# Patient Record
Sex: MALE | Race: WHITE | Employment: OTHER | URBAN - NONMETROPOLITAN AREA
[De-identification: names, ages, dates, MRNs, and addresses within clinical notes are randomized per-mention and may not be internally consistent; named-entity substitution may affect disease eponyms.]

---

## 2020-11-04 LAB
CHOLESTEROL, TOTAL: 213 MG/DL
CHOLESTEROL/HDL RATIO: 3
HDLC SERPL-MCNC: 61 MG/DL (ref 35–70)
LDL CHOLESTEROL CALCULATED: 133 MG/DL (ref 0–160)
NONHDLC SERPL-MCNC: ABNORMAL MG/DL
TRIGL SERPL-MCNC: 96 MG/DL
VLDLC SERPL CALC-MCNC: ABNORMAL MG/DL

## 2020-12-15 ENCOUNTER — OFFICE VISIT (OUTPATIENT)
Dept: FAMILY MEDICINE CLINIC | Age: 60
End: 2020-12-15
Payer: COMMERCIAL

## 2020-12-15 VITALS
DIASTOLIC BLOOD PRESSURE: 90 MMHG | SYSTOLIC BLOOD PRESSURE: 165 MMHG | WEIGHT: 199.6 LBS | BODY MASS INDEX: 28.58 KG/M2 | OXYGEN SATURATION: 99 % | HEART RATE: 71 BPM | HEIGHT: 70 IN | TEMPERATURE: 96.9 F

## 2020-12-15 PROBLEM — Z83.71 FAMILY HISTORY OF COLONIC POLYPS: Status: ACTIVE | Noted: 2020-12-15

## 2020-12-15 PROBLEM — J30.2 SEASONAL ALLERGIES: Status: ACTIVE | Noted: 2020-12-15

## 2020-12-15 PROBLEM — Z91.89 AT RISK FOR SLEEP APNEA: Status: ACTIVE | Noted: 2020-12-15

## 2020-12-15 PROBLEM — M54.32 LEFT SIDED SCIATICA: Status: ACTIVE | Noted: 2020-12-15

## 2020-12-15 PROBLEM — R03.0 ELEVATED BP WITHOUT DIAGNOSIS OF HYPERTENSION: Status: ACTIVE | Noted: 2020-12-15

## 2020-12-15 PROCEDURE — 90715 TDAP VACCINE 7 YRS/> IM: CPT | Performed by: NURSE PRACTITIONER

## 2020-12-15 PROCEDURE — 99386 PREV VISIT NEW AGE 40-64: CPT | Performed by: NURSE PRACTITIONER

## 2020-12-15 PROCEDURE — 90471 IMMUNIZATION ADMIN: CPT | Performed by: NURSE PRACTITIONER

## 2020-12-15 ASSESSMENT — ENCOUNTER SYMPTOMS
COUGH: 0
EYE DISCHARGE: 0
DIARRHEA: 0
BACK PAIN: 1
VOMITING: 0
SHORTNESS OF BREATH: 0
RHINORRHEA: 0
CONSTIPATION: 0
CHEST TIGHTNESS: 0
ABDOMINAL PAIN: 0

## 2020-12-15 ASSESSMENT — PATIENT HEALTH QUESTIONNAIRE - PHQ9
SUM OF ALL RESPONSES TO PHQ QUESTIONS 1-9: 0
SUM OF ALL RESPONSES TO PHQ9 QUESTIONS 1 & 2: 0
SUM OF ALL RESPONSES TO PHQ QUESTIONS 1-9: 0
SUM OF ALL RESPONSES TO PHQ QUESTIONS 1-9: 0
1. LITTLE INTEREST OR PLEASURE IN DOING THINGS: 0
2. FEELING DOWN, DEPRESSED OR HOPELESS: 0

## 2020-12-15 NOTE — PATIENT INSTRUCTIONS
during a routine doctor visit. Your doctor will tell you how often to check your blood pressure based on your age, your blood pressure results, and other factors. · Prostate exam. Talk to your doctor about whether you should have a blood test (called a PSA test) for prostate cancer. Experts recommend that you discuss the benefits and risks of the test with your doctor before you decide whether to have this test.  · Diabetes. Ask your doctor whether you should have tests for diabetes. · Vision. Some experts recommend that you have yearly exams for glaucoma and other age-related eye problems starting at age 48. · Hearing. Tell your doctor if you notice any change in your hearing. You can have tests to find out how well you hear. · Colorectal cancer. Your risk for colorectal cancer gets higher as you get older. Some experts say that adults should start regular screening at age 48 and stop at age 76. Others say to start before age 48 or continue after age 76. Talk with your doctor about your risk and when to start and stop screening. · Heart attack and stroke risk. At least every 4 to 6 years, you should have your risk for heart attack and stroke assessed. Your doctor uses factors such as your age, blood pressure, cholesterol, and whether you smoke or have diabetes to show what your risk for a heart attack or stroke is over the next 10 years. · Abdominal aortic aneurysm. Ask your doctor whether you should have a test to check for an aneurysm. You may need a test if you ever smoked or if your parent, brother, sister, or child has had an aneurysm. When should you call for help? Watch closely for changes in your health, and be sure to contact your doctor if you have any problems or symptoms that concern you. Where can you learn more? Go to https://shen.[employer]. org and sign in to your [employer] account.  Enter A070 in the [facility] box to learn more about \"Well Visit, Men 48 to 72: Care Instructions. \"     If you do not have an account, please click on the \"Sign Up Now\" link. Current as of: May 27, 2020               Content Version: 12.6  © 2006-2020 [employer]. Care instructions adapted under license by [facility] ([facility]). If you have questions about a medical condition or this instruction, always ask your healthcare professional. [address] any warranty or liability for your use of this information.

## 2020-12-15 NOTE — PROGRESS NOTES
[address] [facility], [facility]. [facility] [address]  Dept: [phone]  Dept Fax: : [phone]  Critical access hospital Fax: [phone]     Brandon Lara is a 61 y.o. male who presents today for his medical conditions/complaintsas noted below. Chief Complaint   Patient presents with   [address]     had flu shot at work in October        HPI:      Here to establish care    Family -wife comes to  me  Occupation- [employer], [employer]  [employer]     Has 8 grandchildren  Had 9 kids in the family     Dental - not routinely     Eye - glasses, has had an apt 2-3 years ago     Diet - does not salt extra food, pizza, some fast food, does eat fish, likes fruit and vegetables   Exercise- states that he takes quite a few steps during the day     Last labs- had completed at work in October    Colonoscopy - no  Diarrhea - no  Constipation - no  Has BM once a day, soft  Hx of polyps in the family, not colon cancer    Elevated blood pressure  Onset 2020  Does not monitor BP at home  Unsure of family history   Does have \"achey\" lower left leg that is relieved with rest    Hx of left sided sciatica. Intermittent for years  Does try to stretch and this helps  Denies cauda equina symptoms    Seasonal allergies  Itchy eyes, nasal congestion   Onset years ago  Does take antihistamine    Snoring. Do you snore loudly (loud enough to be heard through closed doors, or your bed partner elbows you for snoring at night? yes    Tired. Do you often feel tired, fatigued, or sleepy during the daytime (such as falling alseep during driving)? rare    Observed. Has anyone observed you stop breathing, or choking/gasping during your sleep? no  Pressure. Do you have or are being treated for high blood pressure? No not, yet has not been to provider in years, elevated today- yes     Body mass index greater than 35 kg/m2?  no    Age older than 48? yes    Neck size large? (measure around srivastava enio)   Male, 17 in or larger- no   Female 16 in or larger  Gender = Male? yes    Scoring criteria    General population      Intermediate risk of ZONIA: Yes to 3-4 questions      References   1. Brandon Mtz et al. Keller Fabry questionaire: a tool to screen patients for obstructive sleep apnea   2. Annel whitley. High STOP-Bang score indicates a high probability of obstructive sleep apnea        Medications:  No current outpatient medications on file. The patienthas No Known Allergies. Past Medical History  Osvaldo Hwang  has no past medical history on file. Past Surgical History  The patient  has no past surgical history on file. Family History  This patient's family history includes Colon Polyps in an other family member. Social History  Osvaldo Hwang  reports that he quit smoking about 4 years ago. His smoking use included cigarettes. He has never used smokeless tobacco.    Health Maintenance  Health Maintenance Due   Topic Date Due    Lipid screen  08/01/2000    Shingles Vaccine (1 of 2) 08/01/2010    Colon cancer screen colonoscopy  08/01/2010       Subjective:     Review of Systems   Constitutional: Negative for activity change, appetite change and fever. HENT: Positive for congestion. Negative for ear pain and rhinorrhea. Eyes: Negative for discharge and visual disturbance. Respiratory: Negative for cough, chest tightness and shortness of breath. Cardiovascular: Negative for chest pain and palpitations. Gastrointestinal: Negative for abdominal pain, constipation, diarrhea and vomiting. Genitourinary: Negative for difficulty urinating and hematuria. Musculoskeletal: Positive for back pain (intermittent ). Negative for arthralgias and myalgias (occasionally left lower leg). Skin: Negative for rash. Allergic/Immunologic: Positive for environmental allergies.    Neurological: Negative for dizziness, weakness, numbness (rare left upper thigh, leg ) and headaches. Psychiatric/Behavioral: The patient is not nervous/anxious. Objective:     BP (!) 165/90   Pulse 71   Temp 96.9 °F (36.1 °C) (Temporal)   Ht 5' 10\" (1.778 m)   Wt 199 lb 9.6 oz (90.5 kg)   SpO2 99%   BMI 28.64 kg/m²      Physical Exam  Vitals signs reviewed. Constitutional:       Appearance: He is well-developed. HENT:      Head: Normocephalic and atraumatic. Right Ear: Tympanic membrane, ear canal and external ear normal.      Left Ear: Tympanic membrane, ear canal and external ear normal.      Nose: Congestion present. Mouth/Throat:      Lips: Pink. Pharynx: Oropharynx is clear. Uvula midline. Tonsils: No tonsillar exudate or tonsillar abscesses. Eyes:      Conjunctiva/sclera: Conjunctivae normal.      Comments: Has glasses   Neck:      Musculoskeletal: Normal range of motion and neck supple. No spinous process tenderness. Thyroid: No thyromegaly. Trachea: Trachea normal.   Cardiovascular:      Rate and Rhythm: Normal rate and regular rhythm. Heart sounds: Normal heart sounds. No murmur. No friction rub. No gallop. Pulmonary:      Effort: Pulmonary effort is normal.      Breath sounds: Normal breath sounds. Abdominal:      General: Bowel sounds are normal.      Palpations: Abdomen is soft. Tenderness: There is no abdominal tenderness. Musculoskeletal: Normal range of motion. Right lower leg: No edema. Left lower leg: No edema. Skin:     General: Skin is warm and dry. Findings: No erythema or rash. Neurological:      Mental Status: He is alert and oriented to person, place, and time. Psychiatric:         Speech: Speech normal.         Behavior: Behavior normal.         Thought Content: Thought content normal.         Assessment/Plan:      Rose Marie De Souza was seen today for establish care.     Diagnoses and all orders for this visit:    Annual physical exam   Has labs completed- will obtain and review with him at his follow up apt in a cooule of weeks  Encounter for biometric screening    Screening for diabetes mellitus    Screening for thyroid disorder    Screening, lipid    Screening for colon cancer  -     MANOLO - Laurie Del Real MD, Gastroenterology, [facility]  With family history of colon polyps will refer for colonoscopy instead of cologuard    Need for hepatitis C screening test    Encounter for screening for HIV    Elevated BP without diagnosis of hypertension  Does not have much room to change lifestyle- is active and has overall healthy diet  Will re eval in 2 weeks- if still elevated will start medication   Family history of colonic polyps  -     MANOLO - Laurie Del Real MD, Gastroenterology, [place] [place]    Need for diphtheria-tetanus-pertussis (Tdap) vaccine  -     Tdap (age 6y and older) IM ([address])    At risk for sleep apnea  Intermediate risk- if BP is elevated at next visit consider pulmonary consult for sleep study  Seasonal allergies  Controlled with OTC medication  Left sided sciatica  Continue stretching       Return in about 2 weeks (around 12/29/2020) for fu BP, labs . Aubrey received counselingon the following healthy behaviors: nutrition and exercise    Patientgiven educational materials on wellness    Discussed use, benefit, andside effects of prescribed medications. Barriers to medication compliance addressed. All patient questions answered. Pt voiced understanding.      Electronically signedby JEREMY Herbert CNP on 12/15/2020 at 9:10 AM

## 2020-12-15 NOTE — PROGRESS NOTES
After obtaining consent, and per orders of Juan Lorenzo CNP, injection of 0.5mL Boostrix given in Left deltoid by Hillary Sethi. Patient instructed to remain in clinic for 20 minutes afterwards, and to report any adverse reaction to me immediately.   Immunizations Administered     Name Date Dose Route    Tdap (Boostrix, Adacel) 12/15/2020 0.5 mL Intramuscular    Site: Deltoid- Left    Lot: [id number]    NDC: 58867-007-57

## 2020-12-29 ENCOUNTER — OFFICE VISIT (OUTPATIENT)
Dept: FAMILY MEDICINE CLINIC | Age: 60
End: 2020-12-29
Payer: COMMERCIAL

## 2020-12-29 VITALS
OXYGEN SATURATION: 99 % | BODY MASS INDEX: 29.13 KG/M2 | TEMPERATURE: 97 F | DIASTOLIC BLOOD PRESSURE: 82 MMHG | SYSTOLIC BLOOD PRESSURE: 137 MMHG | HEART RATE: 72 BPM | WEIGHT: 203 LBS

## 2020-12-29 LAB
BILIRUBIN, POC: NORMAL
BLOOD URINE, POC: NORMAL
CLARITY, POC: CLEAR
COLOR, POC: YELLOW
GLUCOSE URINE, POC: NORMAL
KETONES, POC: NORMAL
LEUKOCYTE EST, POC: NORMAL
NITRITE, POC: NORMAL
PH, POC: 5.5
PROTEIN, POC: NORMAL
SPECIFIC GRAVITY, POC: 1.01
UROBILINOGEN, POC: 0.2

## 2020-12-29 PROCEDURE — 99214 OFFICE O/P EST MOD 30 MIN: CPT | Performed by: NURSE PRACTITIONER

## 2020-12-29 PROCEDURE — 93000 ELECTROCARDIOGRAM COMPLETE: CPT | Performed by: NURSE PRACTITIONER

## 2020-12-29 PROCEDURE — 81003 URINALYSIS AUTO W/O SCOPE: CPT | Performed by: NURSE PRACTITIONER

## 2020-12-29 RX ORDER — ZOSTER VACCINE RECOMBINANT, ADJUVANTED 50 MCG/0.5
0.5 KIT INTRAMUSCULAR SEE ADMIN INSTRUCTIONS
Qty: 0.5 ML | Refills: 0 | Status: SHIPPED | OUTPATIENT
Start: 2020-12-29 | End: 2020-12-30

## 2020-12-29 RX ORDER — LISINOPRIL 10 MG/1
10 TABLET ORAL DAILY
Qty: 30 TABLET | Refills: 0 | Status: CANCELLED | OUTPATIENT
Start: 2020-12-29

## 2020-12-29 SDOH — ECONOMIC STABILITY: TRANSPORTATION INSECURITY
IN THE PAST 12 MONTHS, HAS THE LACK OF TRANSPORTATION KEPT YOU FROM MEDICAL APPOINTMENTS OR FROM GETTING MEDICATIONS?: NO

## 2020-12-29 SDOH — ECONOMIC STABILITY: FOOD INSECURITY: WITHIN THE PAST 12 MONTHS, THE FOOD YOU BOUGHT JUST DIDN'T LAST AND YOU DIDN'T HAVE MONEY TO GET MORE.: NEVER TRUE

## 2020-12-29 SDOH — ECONOMIC STABILITY: TRANSPORTATION INSECURITY
IN THE PAST 12 MONTHS, HAS LACK OF TRANSPORTATION KEPT YOU FROM MEETINGS, WORK, OR FROM GETTING THINGS NEEDED FOR DAILY LIVING?: NO

## 2020-12-29 SDOH — ECONOMIC STABILITY: FOOD INSECURITY: WITHIN THE PAST 12 MONTHS, YOU WORRIED THAT YOUR FOOD WOULD RUN OUT BEFORE YOU GOT MONEY TO BUY MORE.: NEVER TRUE

## 2020-12-29 SDOH — ECONOMIC STABILITY: INCOME INSECURITY: HOW HARD IS IT FOR YOU TO PAY FOR THE VERY BASICS LIKE FOOD, HOUSING, MEDICAL CARE, AND HEATING?: NOT HARD AT ALL

## 2020-12-29 ASSESSMENT — ENCOUNTER SYMPTOMS
ABDOMINAL PAIN: 0
SHORTNESS OF BREATH: 0
RHINORRHEA: 0
CHEST TIGHTNESS: 0
BACK PAIN: 1
COUGH: 0
DIARRHEA: 0
EYE DISCHARGE: 0
CONSTIPATION: 0
VOMITING: 0

## 2020-12-29 NOTE — PROGRESS NOTES
[address] [facility] FAMILY MEDICINE, [facility]. [facility] [address]  Dept: [phone]  Dept Fax: : [phone]  Loc Fax: [phone]     Brandon Easton is a 61 y.o. male who presents today for his medical conditions/complaintsas noted below. Chief Complaint   Patient presents with    Follow-up     blood pressure     Health Maintenance     colonoscopy       HPI:      Wants to review labs from [facility]    Is schedule with Dr. Genie Robledo for colonoscopy next month    Essential Hypertension  Elevated blood pressure  Onset 2020  Does not monitor BP at home  Unsure of family history   Does have \"achey\" lower left leg that is relieved with rest  Denies CP or palpitations. Medications:    Current Outpatient Medications:     zoster recombinant adjuvanted vaccine (SHINGRIX) 50 MCG/0.5ML SUSR injection, Inject 0.5 mLs into the muscle See Admin Instructions for 1 day, Disp: 0.5 mL, Rfl: 0    The patienthas No Known Allergies. Past Medical History  Skye Menendez  has no past medical history on file. Past Surgical History  The patient  has no past surgical history on file. Family History  This patient's family history includes Colon Polyps in an other family member. Social History  Skye Menendez  reports that he quit smoking about 4 years ago. His smoking use included cigarettes. He has never used smokeless tobacco.    Health Maintenance  Health Maintenance Due   Topic Date Due    Lipid screen  08/01/2000    Shingles Vaccine (1 of 2) 08/01/2010    Colon cancer screen colonoscopy  08/01/2010       Subjective:     Review of Systems   Constitutional: Negative for activity change, appetite change and fever. HENT: Positive for congestion. Negative for ear pain and rhinorrhea. Eyes: Negative for discharge and visual disturbance. Respiratory: Negative for cough, chest tightness and shortness of breath. Cardiovascular: Negative for chest pain and palpitations. Gastrointestinal: Negative for abdominal pain, constipation, diarrhea and vomiting. Genitourinary: Negative for difficulty urinating and hematuria. Musculoskeletal: Positive for back pain (intermittent ). Negative for arthralgias and myalgias (occasionally left lower leg). Skin: Negative for rash. Allergic/Immunologic: Positive for environmental allergies. Neurological: Negative for dizziness, weakness, numbness (rare left upper thigh, leg ) and headaches. Psychiatric/Behavioral: The patient is not nervous/anxious. Objective:     /82   Pulse 72   Temp 97 °F (36.1 °C) (Temporal)   Wt 203 lb (92.1 kg)   SpO2 99%   BMI 29.13 kg/m²      Physical Exam  Vitals signs reviewed. Constitutional:       Appearance: He is well-developed. HENT:      Head: Normocephalic and atraumatic. Right Ear: External ear normal.      Left Ear: External ear normal.   Eyes:      Conjunctiva/sclera: Conjunctivae normal.   Neck:      Musculoskeletal: Normal range of motion and neck supple. No spinous process tenderness. Thyroid: No thyromegaly. Trachea: Trachea normal.   Cardiovascular:      Rate and Rhythm: Normal rate and regular rhythm. Heart sounds: Normal heart sounds. No murmur. No friction rub. No gallop. Pulmonary:      Effort: Pulmonary effort is normal.      Breath sounds: Normal breath sounds. Abdominal:      General: Bowel sounds are normal.      Palpations: Abdomen is soft. Tenderness: There is no abdominal tenderness. Musculoskeletal: Normal range of motion. Skin:     General: Skin is warm and dry. Findings: No erythema or rash. Neurological:      Mental Status: He is alert and oriented to person, place, and time. Psychiatric:         Speech: Speech normal.         Behavior: Behavior normal.         Thought Content: Thought content normal.         Assessment/Plan:      Onofre Souza was seen today for follow-up and health maintenance.     Diagnoses and all orders for this visit:    Essential hypertension   New diagnosis   Stage 1   Lifestyle changes, DASH diet, increase exercise   Can monitor at home  -     EKG 12 Lead- normal  -     POCT Urinalysis No Micro (Auto)- reviewed normal    Need for shingles vaccine  -     zoster recombinant adjuvanted vaccine (SHINGRIX) 50 MCG/0.5ML SUSR injection; Inject 0.5 mLs into the muscle See Admin Instructions for 1 day    keep apt for colonoscopy  Discussed benefit of shingles vaccine  Reviewed [facility] screening lab       Return in about 6 months (around 6/29/2021) for FU BP. Aubrey received counselingon the following healthy behaviors: nutrition and exercise    Patientgiven educational materials on Essential HTN    I have instructedAubrey to complete a self tracking handout on Blood Pressures  and instructedthem to bring it with them to his next appointment. Discussed use, benefit, andside effects of prescribed medications. Barriers to medication compliance addressed. All patient questions answered. Pt voiced understanding.      Electronically signedby JEREMY Whitney CNP on 12/29/2020 at 8:39 AM

## 2020-12-29 NOTE — PATIENT INSTRUCTIONS
Patient Education        Learning About High Blood Pressure  What is high blood pressure? Blood pressure is a measure of how hard the blood pushes against the walls of your arteries. It's normal for blood pressure to go up and down throughout the day. But if it stays up, you have high blood pressure. Another name for high blood pressure is hypertension. Two numbers tell you your blood pressure. The first number is the systolic pressure (top number). It shows how hard the blood pushes when your heart is pumping. The second number is the diastolic pressure (bottom number). It shows how hard the blood pushes between heartbeats, when your heart is relaxed and filling with blood. Your doctor will give you a goal for your blood pressure based on your health and your age. High blood pressure (hypertension) means that the top number stays high, or the bottom number stays high, or both. High blood pressure increases the risk of stroke, heart attack, and other problems. What happens when you have high blood pressure? · Blood flows through your arteries with too much force. Over time, this can damage the heart and the walls of your arteries. But you can't feel it. High blood pressure usually doesn't cause symptoms. · High blood pressure makes your heart work harder. And that can lead to heart failure, which means your heart doesn't pump as much blood as your body needs. · Fat and calcium start to build up in your arteries. This buildup is called hardening of the arteries. It can cause many problems including a heart attack and stroke. · Arteries also carry blood and oxygen to organs like your eyes, kidneys, and brain. If high blood pressure damages those arteries, it can lead to vision loss, kidney disease, stroke, and a higher risk of dementia. How can you prevent high blood pressure? · Stay at a healthy weight. · Try to limit how much sodium you eat to less than 2,300 milligrams (mg) a day.  If you limit your

## 2021-07-06 ENCOUNTER — OFFICE VISIT (OUTPATIENT)
Dept: FAMILY MEDICINE CLINIC | Age: 61
End: 2021-07-06
Payer: COMMERCIAL

## 2021-07-06 VITALS
DIASTOLIC BLOOD PRESSURE: 80 MMHG | SYSTOLIC BLOOD PRESSURE: 131 MMHG | TEMPERATURE: 97.7 F | HEART RATE: 81 BPM | BODY MASS INDEX: 28.55 KG/M2 | RESPIRATION RATE: 18 BRPM | WEIGHT: 199.4 LBS | OXYGEN SATURATION: 98 % | HEIGHT: 70 IN

## 2021-07-06 DIAGNOSIS — J30.2 SEASONAL ALLERGIES: ICD-10-CM

## 2021-07-06 DIAGNOSIS — I10 ESSENTIAL HYPERTENSION: Primary | ICD-10-CM

## 2021-07-06 DIAGNOSIS — Z91.89 AT RISK FOR SLEEP APNEA: ICD-10-CM

## 2021-07-06 DIAGNOSIS — Z13.220 SCREENING, LIPID: ICD-10-CM

## 2021-07-06 PROCEDURE — 99214 OFFICE O/P EST MOD 30 MIN: CPT | Performed by: NURSE PRACTITIONER

## 2021-07-06 ASSESSMENT — ENCOUNTER SYMPTOMS
COUGH: 0
DIARRHEA: 0
EYE DISCHARGE: 0
SHORTNESS OF BREATH: 0
CONSTIPATION: 0
CHEST TIGHTNESS: 0
VOMITING: 0
ABDOMINAL PAIN: 0
RHINORRHEA: 0

## 2021-07-06 ASSESSMENT — PATIENT HEALTH QUESTIONNAIRE - PHQ9
SUM OF ALL RESPONSES TO PHQ QUESTIONS 1-9: 0
SUM OF ALL RESPONSES TO PHQ9 QUESTIONS 1 & 2: 0
1. LITTLE INTEREST OR PLEASURE IN DOING THINGS: 0
SUM OF ALL RESPONSES TO PHQ QUESTIONS 1-9: 0
2. FEELING DOWN, DEPRESSED OR HOPELESS: 0
SUM OF ALL RESPONSES TO PHQ QUESTIONS 1-9: 0

## 2021-07-06 NOTE — PROGRESS NOTES
Shelley Ring [address] [facility], [facility]. [facility] [address]  Dept: [phone]  Dept Fax: [phone]    Visit type: Established patient    Reason for Visit: Hypertension (6 month follow up)         Assessment and Plan       1. Essential hypertension  2. Screening, lipid  3. At risk for sleep apnea  -     Svitlana Alcocer MD, Pulmonology, [address]  4. Seasonal allergies    Is going to get labs done through work  Continue with DASH diet and increase exercise    Return in about 6 months (around 1/6/2022) for Anual exam.       Subjective       Gets labs [facility]    Essential Hypertension  Elevated blood pressure  Onset 2020  Does not monitor BP at home  Unsure of family history   Denies CP or palpitations. Seasonal allergies  Triggers year round   Takes - once in a while takes antihistamine     Snoring. Do you snore loudly (loud enough to be heard through closed doors, or your bed partner elbows you for snoring at night? yes    Tired. Do you often feel tired, fatigued, or sleepy during the daytime (such as falling alseep during driving)? yes    Observed. Has anyone observed you stop breathing, or choking/gasping during your sleep? yes    Pressure. Do you have or are being treated for high blood pressure? Yes     Body mass index greater than 35 kg/m2? no    Age older than 48? yes    Neck size large? (measure around srivastava apple)   Male, 17 in or larger- no   Female 16 in or larger  Gender = Male? Yes     Scoring criteria    General population      High risk of ZONIA: Yes to 5-8 questions    References   1. Heraclio Alex, et al. Abner Pereyra questionaire: a tool to screen patients for obstructive sleep apnea   2. Moriah LINDAet al. High STOP-Bang score indicates a high probability of obstructive sleep apnea         Review of Systems   Constitutional: Positive for fatigue. Negative for activity change, appetite change and fever.    HENT: Negative for congestion, ear pain and rhinorrhea. Eyes: Negative for discharge and visual disturbance. Respiratory: Negative for cough, chest tightness and shortness of breath. Cardiovascular: Negative for chest pain and palpitations. Gastrointestinal: Negative for abdominal pain, constipation, diarrhea and vomiting. Genitourinary: Negative for difficulty urinating and hematuria. Musculoskeletal: Negative for arthralgias and myalgias. Skin: Negative for rash. Allergic/Immunologic: Positive for environmental allergies. Neurological: Negative for dizziness, weakness, numbness and headaches. Psychiatric/Behavioral: The patient is not nervous/anxious. No Known Allergies    No outpatient medications prior to visit. No facility-administered medications prior to visit. History reviewed. No pertinent past medical history. Social History     Tobacco Use    Smoking status: Former Smoker     Types: Cigarettes     Quit date: 2016     Years since quittin.4    Smokeless tobacco: Never Used   Substance Use Topics    Alcohol use: Not on file        History reviewed. No pertinent surgical history. Family History   Problem Relation Age of Onset    Colon Polyps Other        Objective       /80 (Site: Left Upper Arm, Position: Sitting, Cuff Size: Large Adult)   Pulse 81   Temp 97.7 °F (36.5 °C) (Temporal)   Resp 18   Ht 5' 9.5\" (1.765 m)   Wt 199 lb 6.4 oz (90.4 kg)   SpO2 98%   BMI 29.02 kg/m²   Physical Exam  Vitals reviewed. Constitutional:       Appearance: He is well-developed. HENT:      Head: Normocephalic and atraumatic. Right Ear: External ear normal.      Left Ear: External ear normal.   Eyes:      Conjunctiva/sclera: Conjunctivae normal.   Neck:      Thyroid: No thyromegaly. Trachea: Trachea normal.   Cardiovascular:      Rate and Rhythm: Normal rate and regular rhythm. Heart sounds: Normal heart sounds. No murmur heard.    No friction rub. No gallop. Pulmonary:      Effort: Pulmonary effort is normal.      Breath sounds: Normal breath sounds. Abdominal:      General: Bowel sounds are normal.      Palpations: Abdomen is soft. Tenderness: There is no abdominal tenderness. Musculoskeletal:         General: Normal range of motion. Cervical back: Normal range of motion and neck supple. No spinous process tenderness. Skin:     General: Skin is warm and dry. Findings: No erythema or rash. Neurological:      Mental Status: He is alert and oriented to person, place, and time. Psychiatric:         Speech: Speech normal.         Behavior: Behavior normal.         Thought Content:  Thought content normal.           Data Reviewed and Summarized       Labs:     Imaging/Testing:        Elisa Russo, APRN - CNP

## 2022-01-25 ENCOUNTER — HOSPITAL ENCOUNTER (OUTPATIENT)
Age: 62
Setting detail: SPECIMEN
Discharge: HOME | End: 2022-01-25

## 2022-01-25 ENCOUNTER — OFFICE VISIT (OUTPATIENT)
Dept: FAMILY MEDICINE CLINIC | Age: 62
End: 2022-01-25
Payer: COMMERCIAL

## 2022-01-25 VITALS
SYSTOLIC BLOOD PRESSURE: 138 MMHG | DIASTOLIC BLOOD PRESSURE: 88 MMHG | HEART RATE: 84 BPM | RESPIRATION RATE: 18 BRPM | OXYGEN SATURATION: 98 % | BODY MASS INDEX: 29.46 KG/M2 | TEMPERATURE: 97.6 F | WEIGHT: 202.4 LBS

## 2022-01-25 DIAGNOSIS — Z12.5 SCREENING FOR PROSTATE CANCER: ICD-10-CM

## 2022-01-25 DIAGNOSIS — Z00.00 ANNUAL PHYSICAL EXAM: ICD-10-CM

## 2022-01-25 DIAGNOSIS — Z13.220 SCREENING, LIPID: ICD-10-CM

## 2022-01-25 DIAGNOSIS — Z13.1 SCREENING FOR DIABETES MELLITUS: ICD-10-CM

## 2022-01-25 DIAGNOSIS — I10 ESSENTIAL HYPERTENSION: ICD-10-CM

## 2022-01-25 DIAGNOSIS — I10 ESSENTIAL HYPERTENSION: Primary | ICD-10-CM

## 2022-01-25 DIAGNOSIS — J30.2 SEASONAL ALLERGIES: ICD-10-CM

## 2022-01-25 PROCEDURE — 36415 COLL VENOUS BLD VENIPUNCTURE: CPT | Performed by: NURSE PRACTITIONER

## 2022-01-25 PROCEDURE — 99396 PREV VISIT EST AGE 40-64: CPT | Performed by: NURSE PRACTITIONER

## 2022-01-25 SDOH — ECONOMIC STABILITY: FOOD INSECURITY: WITHIN THE PAST 12 MONTHS, THE FOOD YOU BOUGHT JUST DIDN'T LAST AND YOU DIDN'T HAVE MONEY TO GET MORE.: NEVER TRUE

## 2022-01-25 SDOH — ECONOMIC STABILITY: FOOD INSECURITY: WITHIN THE PAST 12 MONTHS, YOU WORRIED THAT YOUR FOOD WOULD RUN OUT BEFORE YOU GOT MONEY TO BUY MORE.: NEVER TRUE

## 2022-01-25 ASSESSMENT — ENCOUNTER SYMPTOMS
COUGH: 0
VOMITING: 0
SHORTNESS OF BREATH: 0
EYE DISCHARGE: 0
BACK PAIN: 1
CONSTIPATION: 0
RHINORRHEA: 0
DIARRHEA: 0
ABDOMINAL PAIN: 0
CHEST TIGHTNESS: 0

## 2022-01-25 ASSESSMENT — SOCIAL DETERMINANTS OF HEALTH (SDOH): HOW HARD IS IT FOR YOU TO PAY FOR THE VERY BASICS LIKE FOOD, HOUSING, MEDICAL CARE, AND HEATING?: NOT HARD AT ALL

## 2022-01-25 NOTE — PATIENT INSTRUCTIONS
Patient Education        Well Visit, Men 48 to 72: Care Instructions  Overview     Well visits can help you stay healthy. Your doctor has checked your overall health and may have suggested ways to take good care of yourself. Your doctor also may have recommended tests. At home, you can help prevent illness with healthy eating, regular exercise, and other steps. Follow-up care is a key part of your treatment and safety. Be sure to make and go to all appointments, and call your doctor if you are having problems. It's also a good idea to know your test results and keep a list of the medicines you take. How can you care for yourself at home? · Get screening tests that you and your doctor decide on. Screening helps find diseases before any symptoms appear. · Eat healthy foods. Choose fruits, vegetables, whole grains, protein, and low-fat dairy foods. Limit fat, especially saturated fat. Reduce salt in your diet. · Limit alcohol. Have no more than 2 drinks a day or 14 drinks a week. · Get at least 30 minutes of exercise on most days of the week. Walking is a good choice. You also may want to do other activities, such as running, swimming, cycling, or playing tennis or team sports. · Reach and stay at a healthy weight. This will lower your risk for many problems, such as obesity, diabetes, heart disease, and high blood pressure. · Do not smoke. Smoking can make health problems worse. If you need help quitting, talk to your doctor about stop-smoking programs and medicines. These can increase your chances of quitting for good. · Care for your mental health. It is easy to get weighed down by worry and stress. Learn strategies to manage stress, like deep breathing and mindfulness, and stay connected with your family and community. If you find you often feel sad or hopeless, talk with your doctor. Treatment can help.   · Talk to your doctor about whether you have any risk factors for sexually transmitted infections (STIs). You can help prevent STIs if you wait to have sex with a new partner (or partners) until you've each been tested for STIs. It also helps if you use condoms (male or female condoms) and if you limit your sex partners to one person who only has sex with you. Vaccines are available for some STIs. · If it's important to you to prevent pregnancy with your partner, talk with your doctor about birth control options that might be best for you. · If you think you may have a problem with alcohol or drug use, talk to your doctor. This includes prescription medicines (such as amphetamines and opioids) and illegal drugs (such as cocaine and methamphetamine). Your doctor can help you figure out what type of treatment is best for you. · Protect your skin from too much sun. When you're outdoors from 10 a.m. to 4 p.m., stay in the shade or cover up with clothing and a hat with a wide brim. Wear sunglasses that block UV rays. Even when it's cloudy, put broad-spectrum sunscreen (SPF 30 or higher) on any exposed skin. · See a dentist one or two times a year for checkups and to have your teeth cleaned. · Wear a seat belt in the car. When should you call for help? Watch closely for changes in your health, and be sure to contact your doctor if you have any problems or symptoms that concern you. Where can you learn more? Go to [email or web address] and sign in to your [employer] account. Enter Q803 in the [facility] box to learn more about \"Well Visit, Men 48 to 72: Care Instructions. \"     If you do not have an account, please click on the \"Sign Up Now\" link. Current as of: October 6, 2021               Content Version: 13.1  © 2006-2021 Healthwise, Incorporated. Care instructions adapted under license by [facility] ([facility]).  If you have questions about a medical condition or this instruction, always ask your healthcare professional. [address]  any warranty or liability for your use of this information.

## 2022-01-25 NOTE — PROGRESS NOTES
Marisa Russell [address] [facility] [address]  Dept: [phone]  Dept Fax: [phone]    Visit type: Established patient    Reason for Visit: Hypertension (6 month follow up) and Discuss Labs (would like labs ordered- fasted)         Assessment and Plan       1. Essential hypertension  2. Screening, lipid  3. Seasonal allergies  4. Annual physical exam    Was referred for sleep study   Will get labs today  Has had all of covid vaccine along with booster completed       Return in about 6 months (around 7/25/2022) for fu chronic issues. Subjective       [facility]   Here for annual exam    Is retiring and is going to do some traveling     Essential Hypertension  Elevated blood pressure  Onset 2020  Does not monitor BP at home  Unsure of family history   Denies CP or palpitations. Diet- states that he probably eats more red meat than he should but eats vegetables    Seasonal allergies  Triggers year round   Takes - once in a while takes antihistamine     Dental- is up to date    Eye- has glasses, goes routinely     Snoring. Do you snore loudly (loud enough to be heard through closed doors, or your bed partner elbows you for snoring at night? yes    Tired. Do you often feel tired, fatigued, or sleepy during the daytime (such as falling alseep during driving)? yes    Observed. Has anyone observed you stop breathing, or choking/gasping during your sleep? yes    Pressure. Do you have or are being treated for high blood pressure? Yes     Body mass index greater than 35 kg/m2? no    Age older than 48? yes    Neck size large? (measure around srivastava apple)   Male, 17 in or larger- no   Female 16 in or larger  Gender = Male? Yes     Scoring criteria    General population      High risk of ZONIA: Yes to 5-8 questions    References   1. Modesto Gutierrez et al. Amy mitchellaire: a tool to screen patients for obstructive sleep apnea   2. Matias whitley. High STOP-Bang score indicates a high probability of obstructive sleep apnea         Review of Systems   Constitutional: Negative for activity change, appetite change and fever. HENT: Negative for congestion, ear pain and rhinorrhea. Eyes: Negative for discharge and visual disturbance. Respiratory: Negative for cough, chest tightness and shortness of breath. Cardiovascular: Negative for chest pain and palpitations. Gastrointestinal: Negative for abdominal pain, constipation, diarrhea and vomiting. Genitourinary: Negative for difficulty urinating and hematuria. Musculoskeletal: Positive for back pain. Negative for arthralgias and myalgias. Skin: Negative for rash. Neurological: Negative for dizziness, weakness, numbness and headaches. Psychiatric/Behavioral: The patient is not nervous/anxious. No Known Allergies    No outpatient medications prior to visit. No facility-administered medications prior to visit. History reviewed. No pertinent past medical history. Social History     Tobacco Use    Smoking status: Former Smoker     Types: Cigarettes     Quit date: 2016     Years since quittin.9    Smokeless tobacco: Never Used   Substance Use Topics    Alcohol use: Not on file        History reviewed. No pertinent surgical history. Family History   Problem Relation Age of Onset    Colon Polyps Other        Objective       /88 (Site: Left Upper Arm, Position: Sitting, Cuff Size: Large Adult) Comment: manual  Pulse 84   Temp 97.6 °F (36.4 °C) (Temporal)   Resp 18   Wt 202 lb 6.4 oz (91.8 kg)   SpO2 98%   BMI 29.46 kg/m²   Physical Exam  Vitals reviewed. Constitutional:       Appearance: He is well-developed. HENT:      Head: Normocephalic and atraumatic.       Right Ear: External ear normal.      Left Ear: External ear normal.   Eyes:      Conjunctiva/sclera: Conjunctivae normal.   Neck:      Thyroid: No thyromegaly. Trachea: Trachea normal.   Cardiovascular:      Rate and Rhythm: Normal rate and regular rhythm. Heart sounds: Normal heart sounds. No murmur heard. No friction rub. No gallop. Pulmonary:      Effort: Pulmonary effort is normal.      Breath sounds: Normal breath sounds. Abdominal:      General: Bowel sounds are normal.      Palpations: Abdomen is soft. Tenderness: There is no abdominal tenderness. Musculoskeletal:         General: Normal range of motion. Cervical back: Normal range of motion and neck supple. No spinous process tenderness. Skin:     General: Skin is warm and dry. Findings: No erythema or rash. Neurological:      Mental Status: He is alert and oriented to person, place, and time. Psychiatric:         Speech: Speech normal.         Behavior: Behavior normal.         Thought Content:  Thought content normal.           Data Reviewed and Summarized       Labs:     Imaging/Testing:        Sharyl Paget, APRN - CNP

## 2022-01-26 PROBLEM — R73.03 PREDIABETES: Status: ACTIVE | Noted: 2022-01-26

## 2022-01-26 LAB
ABSOLUTE EOS #: 0.37 K/UL (ref 0–0.44)
ABSOLUTE IMMATURE GRANULOCYTE: 0.2 K/UL (ref 0–0.3)
ABSOLUTE LYMPH #: 2.39 K/UL (ref 1.1–3.7)
ABSOLUTE MONO #: 0.9 K/UL (ref 0.1–1.2)
ALBUMIN SERPL-MCNC: 4.5 G/DL (ref 3.5–5.2)
ALBUMIN/GLOBULIN RATIO: 1.3 (ref 1–2.5)
ALP BLD-CCNC: 89 U/L (ref 40–129)
ALT SERPL-CCNC: 27 U/L (ref 5–41)
ANION GAP SERPL CALCULATED.3IONS-SCNC: 14 MMOL/L (ref 9–17)
AST SERPL-CCNC: 23 U/L
BASOPHILS # BLD: 1 % (ref 0–2)
BASOPHILS ABSOLUTE: 0.06 K/UL (ref 0–0.2)
BILIRUB SERPL-MCNC: 0.32 MG/DL (ref 0.3–1.2)
BUN BLDV-MCNC: 14 MG/DL (ref 8–23)
BUN/CREAT BLD: NORMAL (ref 9–20)
CALCIUM SERPL-MCNC: 10 MG/DL (ref 8.6–10.4)
CHLORIDE BLD-SCNC: 101 MMOL/L (ref 98–107)
CHOLESTEROL/HDL RATIO: 3.1
CHOLESTEROL: 195 MG/DL
CO2: 21 MMOL/L (ref 20–31)
CREAT SERPL-MCNC: 0.93 MG/DL (ref 0.7–1.2)
DIFFERENTIAL TYPE: ABNORMAL
EOSINOPHILS RELATIVE PERCENT: 6 % (ref 1–4)
ESTIMATED AVERAGE GLUCOSE: 120 MG/DL
GFR AFRICAN AMERICAN: >60 ML/MIN
GFR NON-AFRICAN AMERICAN: >60 ML/MIN
GFR SERPL CREATININE-BSD FRML MDRD: NORMAL ML/MIN/{1.73_M2}
GFR SERPL CREATININE-BSD FRML MDRD: NORMAL ML/MIN/{1.73_M2}
GLUCOSE FASTING: 98 MG/DL (ref 70–99)
HBA1C MFR BLD: 5.8 % (ref 4–6)
HCT VFR BLD CALC: 46.4 % (ref 40.7–50.3)
HDLC SERPL-MCNC: 62 MG/DL
HEMOGLOBIN: 15.1 G/DL (ref 13–17)
IMMATURE GRANULOCYTES: 3 %
LDL CHOLESTEROL: 122 MG/DL (ref 0–130)
LYMPHOCYTES # BLD: 36 % (ref 24–43)
MAGNESIUM: 2.1 MG/DL (ref 1.6–2.6)
MCH RBC QN AUTO: 30 PG (ref 25.2–33.5)
MCHC RBC AUTO-ENTMCNC: 32.5 G/DL (ref 28.4–34.8)
MCV RBC AUTO: 92.1 FL (ref 82.6–102.9)
MONOCYTES # BLD: 13 % (ref 3–12)
NRBC AUTOMATED: 0 PER 100 WBC
PDW BLD-RTO: 13 % (ref 11.8–14.4)
PLATELET # BLD: 228 K/UL (ref 138–453)
PLATELET ESTIMATE: ABNORMAL
PMV BLD AUTO: 11.2 FL (ref 8.1–13.5)
POTASSIUM SERPL-SCNC: 4.5 MMOL/L (ref 3.7–5.3)
PROSTATE SPECIFIC ANTIGEN: 0.35 UG/L
RBC # BLD: 5.04 M/UL (ref 4.21–5.77)
RBC # BLD: ABNORMAL 10*6/UL
SEG NEUTROPHILS: 41 % (ref 36–65)
SEGMENTED NEUTROPHILS ABSOLUTE COUNT: 2.79 K/UL (ref 1.5–8.1)
SODIUM BLD-SCNC: 136 MMOL/L (ref 135–144)
TOTAL PROTEIN: 8.1 G/DL (ref 6.4–8.3)
TRIGL SERPL-MCNC: 57 MG/DL
TSH SERPL DL<=0.05 MIU/L-ACNC: 1.57 MIU/L (ref 0.3–5)
VITAMIN D 25-HYDROXY: 27.3 NG/ML (ref 30–100)
VLDLC SERPL CALC-MCNC: NORMAL MG/DL (ref 1–30)
WBC # BLD: 6.7 K/UL (ref 3.5–11.3)
WBC # BLD: ABNORMAL 10*3/UL

## 2022-07-25 ENCOUNTER — OFFICE VISIT (OUTPATIENT)
Dept: FAMILY MEDICINE CLINIC | Age: 62
End: 2022-07-25

## 2022-07-25 VITALS
SYSTOLIC BLOOD PRESSURE: 131 MMHG | TEMPERATURE: 97.2 F | DIASTOLIC BLOOD PRESSURE: 77 MMHG | WEIGHT: 200 LBS | BODY MASS INDEX: 28.63 KG/M2 | HEIGHT: 70 IN | HEART RATE: 74 BPM | OXYGEN SATURATION: 100 % | RESPIRATION RATE: 18 BRPM

## 2022-07-25 DIAGNOSIS — L21.9 DERMATITIS, SEBORRHEIC: ICD-10-CM

## 2022-07-25 DIAGNOSIS — R73.03 PREDIABETES: ICD-10-CM

## 2022-07-25 DIAGNOSIS — M54.32 LEFT SIDED SCIATICA: ICD-10-CM

## 2022-07-25 DIAGNOSIS — Z83.71 FAMILY HISTORY OF COLONIC POLYPS: ICD-10-CM

## 2022-07-25 DIAGNOSIS — I10 ESSENTIAL HYPERTENSION: ICD-10-CM

## 2022-07-25 DIAGNOSIS — R13.19 ESOPHAGEAL DYSPHAGIA: ICD-10-CM

## 2022-07-25 DIAGNOSIS — Z87.891 PERSONAL HISTORY OF TOBACCO USE: ICD-10-CM

## 2022-07-25 DIAGNOSIS — J30.2 SEASONAL ALLERGIES: Primary | ICD-10-CM

## 2022-07-25 DIAGNOSIS — Z12.5 SCREENING PSA (PROSTATE SPECIFIC ANTIGEN): ICD-10-CM

## 2022-07-25 DIAGNOSIS — Z91.89 AT RISK FOR SLEEP APNEA: ICD-10-CM

## 2022-07-25 LAB — HBA1C MFR BLD: 5.6 %

## 2022-07-25 PROCEDURE — 83036 HEMOGLOBIN GLYCOSYLATED A1C: CPT | Performed by: NURSE PRACTITIONER

## 2022-07-25 PROCEDURE — G0296 VISIT TO DETERM LDCT ELIG: HCPCS | Performed by: NURSE PRACTITIONER

## 2022-07-25 PROCEDURE — 99214 OFFICE O/P EST MOD 30 MIN: CPT | Performed by: NURSE PRACTITIONER

## 2022-07-25 ASSESSMENT — PATIENT HEALTH QUESTIONNAIRE - PHQ9
1. LITTLE INTEREST OR PLEASURE IN DOING THINGS: 0
SUM OF ALL RESPONSES TO PHQ QUESTIONS 1-9: 0
SUM OF ALL RESPONSES TO PHQ9 QUESTIONS 1 & 2: 0
2. FEELING DOWN, DEPRESSED OR HOPELESS: 0

## 2022-07-25 ASSESSMENT — ENCOUNTER SYMPTOMS
VOMITING: 1
RHINORRHEA: 0
SHORTNESS OF BREATH: 0
CHEST TIGHTNESS: 0
DIARRHEA: 0
ABDOMINAL PAIN: 0
BACK PAIN: 1
CONSTIPATION: 0
COUGH: 0
EYE DISCHARGE: 0

## 2022-07-25 NOTE — PROGRESS NOTES
Shu Sanchez [address] [facility]. [address]  Dept: [phone]  Dept Fax: [phone]    Visit type: Established patient    Reason for Visit: 6 Month Follow-Up (Chronic issues ), Health Maintenance (Vaccines ), Skin Problem (On face / not painful / noticed a while ago / hx of cancer spot in same area ), and Choking (Off and on for about a year per pt )         Assessment and Plan       1. Seasonal allergies  2. Left sided sciatica  3. Prediabetes  -     CBC with Auto Differential; Future  -     Comprehensive Metabolic Panel, Fasting; Future  -     Hemoglobin A1C; Future  -     Lipid Panel; Future  -     Magnesium; Future  -     TSH with Reflex; Future  -     Vitamin D 25 Hydroxy; Future  -     POCT glycosylated hemoglobin (Hb A1C)  4. At risk for sleep apnea  5. Family history of colonic polyps  6. Essential hypertension  -     CBC with Auto Differential; Future  -     Comprehensive Metabolic Panel, Fasting; Future  -     Hemoglobin A1C; Future  -     Lipid Panel; Future  -     Magnesium; Future  -     TSH with Reflex; Future  -     Vitamin D 25 Hydroxy; Future  7. Screening PSA (prostate specific antigen)  -     PSA Screening; Future  8. Personal history of tobacco use  -     CO VISIT TO DISCUSS LUNG CA SCREEN W LDCT  -     CT Lung Screen (Annual); Future  9. Dermatitis, seborrheic  -     AFL - Supa Reyna MD, Dermatology, [facility] VIKRAM PRATHERENEMARLENY MUNIZ  10.  Esophageal dysphagia  -     AFL - Tiffanie Mesa MD, Gastroenterology, [facility] VIKRAM MORFIN OFFENEGG II.HUA    Did not do referral for sleep apnea  AHA/ ADA diet  Labs prior to next apt  POCT a1c today  Shingles and covid vaccines at pharmacy- is going to get at fall  with flu vaccine   Advised to use sun tan lotion   Referral for GI for possible EGD and dilation   Return in about 6 months (around 1/25/2023) for Anual exam.       Subjective       [facility]   Here for follow up chronic issues    Is retiring   Is gardening, moved to [place] and has ingrown pool     Essential Hypertension  Onset 2020  Does not monitor BP at home   Unsure of family history   Denies CP or palpitations. Prediabetes   Exercise- is walking   Diet- states that he probably eats more red meat than he should but eats vegetables    Seasonal allergies  Triggers year round   Takes - once in a while takes antihistamine     Skin concern  Previous specialist- Dr. Татьяна Sutton   Had previous biopsy- normal  Had cryotherapy in the past       dysphagia  States that with eating he does feel like it is going down all the way  Has to puke to get it out       Snoring. Do you snore loudly (loud enough to be heard through closed doors, or your bed partner elbows you for snoring at night? yes    Tired. Do you often feel tired, fatigued, or sleepy during the daytime (such as falling alseep during driving)? yes    Observed. Has anyone observed you stop breathing, or choking/gasping during your sleep? yes    Pressure. Do you have or are being treated for high blood pressure? Yes     Body mass index greater than 35 kg/m2? no    Age older than 48? yes    Neck size large? (measure around srivastava apple)   Male, 17 in or larger- no   Female 16 in or larger  Gender = Male? Yes     Scoring criteria    General population      High risk of ZONIA: Yes to 5-8 questions    References   1. Makeda Mahan et al. Odette mitchellaire: a tool to screen patients for obstructive sleep apnea   2. Naga LINDAet al. High STOP-Bang score indicates a high probability of obstructive sleep apnea    Declines pulmonary referral        Review of Systems   Constitutional:  Negative for activity change, appetite change and fever. HENT:  Negative for congestion, ear pain and rhinorrhea. Eyes:  Negative for discharge and visual disturbance. Respiratory:  Negative for cough, chest tightness and shortness of breath. Cardiovascular:  Negative for chest pain and palpitations.    Gastrointestinal: General: Normal range of motion. Cervical back: Normal range of motion and neck supple. No spinous process tenderness. Skin:     General: Skin is warm and dry. Findings: No erythema or rash. Neurological:      Mental Status: He is alert and oriented to person, place, and time. Psychiatric:         Speech: Speech normal.         Behavior: Behavior normal.         Thought Content:  Thought content normal.         Data Reviewed and Summarized       Labs:   Lab Results   Component Value Date    LABA1C 5.8 01/25/2022     Lab Results   Component Value Date     01/25/2022     Lab Results   Component Value Date    CHOL 195 01/25/2022    CHOL 213 (H) 11/04/2020    CHOL 213 (H) 11/04/2020     Lab Results   Component Value Date    TRIG 57 01/25/2022    TRIG 96 11/04/2020    TRIG 96 11/04/2020     Lab Results   Component Value Date    HDL 62 01/25/2022    HDL 61 11/04/2020    HDL 61 11/04/2020     Lab Results   Component Value Date    LDLCHOLESTEROL 122 01/25/2022    LDLCALC 133 (H) 11/04/2020    LDLCALC 133 (H) 11/04/2020     Lab Results   Component Value Date    VLDL NOT REPORTED 01/25/2022     Lab Results   Component Value Date    CHOLHDLRATIO 3.1 01/25/2022    CHOLHDLRATIO 3 11/04/2020     Lab Results   Component Value Date    TSH 1.57 01/25/2022     Lab Results   Component Value Date    WBC 6.7 01/25/2022    HGB 15.1 01/25/2022    HCT 46.4 01/25/2022    MCV 92.1 01/25/2022     01/25/2022    LYMPHOPCT 36 01/25/2022    RBC 5.04 01/25/2022    MCH 30.0 01/25/2022    MCHC 32.5 01/25/2022    RDW 13.0 01/25/2022         Lab Results   Component Value Date     01/25/2022    K 4.5 01/25/2022     01/25/2022    CO2 21 01/25/2022    BUN 14 01/25/2022    CREATININE 0.93 01/25/2022    GLUCOSE 85 11/04/2020    CALCIUM 10.0 01/25/2022    PROT 8.1 01/25/2022    LABALBU 4.5 01/25/2022    BILITOT 0.32 01/25/2022    ALKPHOS 89 01/25/2022    AST 23 01/25/2022    ALT 27 01/25/2022    LABGLOM >60 01/25/2022    GFRAA >60 01/25/2022           Imaging/Testing:        HENRIQUE ROGERS, APRN - CNP          Low Dose CT (LDCT) Lung Screening criteria met:     Age 50-77(Medicare) or 50-80 ([facility])   Pack year smoking >20   Still smoking or less than 15 year since quit   No sign or symptoms of lung cancer   > 11 months since last LDCT     Risks and benefits of lung cancer screening with LDCT scans discussed:    Significance of positive screen - False-positive LDCT results often occur. 95% of all positive results do not lead to a diagnosis of cancer. Usually further imaging can resolve most false-positive results; however, some patients may require invasive procedures. Over diagnosis risk - 10% to 12% of screen-detected lung cancer cases are over diagnosed--that is, the cancer would not have been detected in the patient's lifetime without the screening. Need for follow up screens annually to continue lung cancer screening effectiveness     Risks associated with radiation from annual LDCT- Radiation exposure is about the same as for a mammogram, which is about 1/3 of the annual background radiation exposure from everyday life. Starting screening at age 54 is not likely to increase cancer risk from radiation exposure. Patients with comorbidities resulting in life expectancy of < 10 years, or that would preclude treatment of an abnormality identified on CT, should not be screened due to lack of benefit.     To obtain maximal benefit from this screening, smoking cessation and long-term abstinence from smoking is critical

## 2022-10-06 NOTE — H&P
[address] [address]  Sedation/Analgesia History & Physical    Patient: Farzana Mccarthy: 1960  Med Rec#: [id number] Acc#: [id number]   Provider Performing Procedure: Corrine Cedeno MD  Primary Care Physician: JEREMY Zhu CNP    PRE-PROCEDURE   Brief History/Pre-Procedure Diagnosis:The patient is a 58 y.o.,  male with significant past medical history of dysphagia. MEDICAL HISTORY  []CAD/Valve  []Liver Disease  []Lung Disease []Diabetes  []Hypertension []Renal Disease  [x]Additional information:       has no past medical history on file. SURGICAL HISTORY   has no past surgical history on file. Additional information:       ALLERGIES   Allergies as of 09/15/2022    (No Known Allergies)     Additional information:       MEDICATIONS       Current Facility-Administered Medications:     lactated ringers infusion, , IntraVENous, Continuous, Brandon Escamilla MD  Prior to Admission medications    Not on File     Additional information:       PHYSICAL:    height is 5' 10\" (1.778 m) and weight is 199 lb 3.2 oz (90.4 kg). His temporal temperature is 97.1 °F (36.2 °C). His blood pressure is 151/77 (abnormal) and his pulse is 77. His oxygen saturation is 98%. Heart:  [x]Regular rate and rhythm  []Other:    Lungs:  [x]Clear    []Other:    Abdomen: [x]Soft    []Other:    Mental Status: [x]Alert & Oriented  []Other:      VITAL SIGNS   Patient Vitals for the past 24 hrs:   BP Temp Temp src Pulse SpO2 Height Weight   10/14/22 0645 (!) 151/77 97.1 °F (36.2 °C) Temporal 77 98 % 5' 10\" (1.778 m) 199 lb 3.2 oz (90.4 kg)       PLANNED PROCEDURE   [x]EGD  []Colonoscopy []Flex Sigmoid  []ERCP []EUS   []Cystoscopy  [] CATH [] BRONCH     Consent: I have discussed with the patient and/or the patient representative the indication, alternatives, and the possible risks and/or complications of the planned procedure and the anesthesia methods.  The patient and/or patient representative appear to understand and agree to proceed. SEDATION (see Anesthesia note)    ASA Classification: Class 2 - A normal healthy patient with mild systemic disease    Airway Assessment: normal    Monitoring and Safety: The patient will be placed on a cardiac monitor and vital signs, pulse oximetry and level of consciousness will be continuously evaluated throughout the procedure. The patient will be closely monitored until recovery from the medications is complete and the patient has returned to baseline status. Respiratory therapy will be on standby during the procedure. [x]Pre-procedure diagnostic studies complete and results available. Comment:    [x]Previous sedation/anesthesia experiences assessed. Comment:    [x]The patient is an appropriate candidate to undergo the planned procedure sedation and anesthesia. (Refer to nursing sedation/analgesia documentation record)  [x]Formulation and discussion of sedation/procedure plan, risks, and expectations with patient and/or responsible adult completed. [x]Patient examined immediately prior to the procedure.  (Refer to nursing sedation/analgesia documentation record)    Collette Parisian, MD   Electronically signed 10/14/2022 at 7:44 AM

## 2022-10-06 NOTE — PROCEDURES
[address] [address] Endoscopy     EGD Report    Patient: Hodges Apley  : 1960  Acct#: [de-identified]     BRIEF HISTORY AND INDICATIONS:    The patient is a 58 y.o.,  male with significant past medical history of dysphagia and GERD. PREMEDICATION:   TIVA anesthesia was used, see Anesthesia note for details. INSTRUMENT:  Olympus GIF H-180 gastroscope    The risks and benefits of upper endoscopy with biopsy and dilation were  described to the patient, including but not limited to bleeding, infection, poking a hole someplace requiring surgery to fix it, having reaction to medication, and death. The patient understood these risks and provided informed consent. The patient was placed in the left lateral decubitus position. Conscious sedation was administered . The patient was continuously monitored to ensure adequate sedation and patient safety. A forward-viewing Olympus endoscope was lubricated and inserted through the mouth into the oropharynx. Under direct visualization, the upper esophagus was intubated. The scope was advanced to the esophagus and stomach to second portion of duodenum. Scope was slowly withdrawn with good views of mucosal surfaces. The scope was retroflexed in the fundus. Findings and maneuvers are listed in impression below. The patient tolerated the procedure well. The scope was removed. The patient was removed to the recovery area. IMPRESSION:      1. Successful esophageal dilation with American 48 Macanese dilator . Did not dilate further for concern of EoE. 2.  Biopsies taken of the stomach  throughout, mid and distal esophagus. .      3. Moderate Gastritis, rings in the esophagus, Schatzki's ring, initially dilated with EGD scope, and inflammation of the distal esophagus gastroesophageal junction. 4.  Needs Propofol for procedures , has a high tolerance. 5.  Otherwise, normal to the 2nd portion of the duodenum .      RECOMMENDATIONS: 1. Post dilation precautions. 2. Begin taking Omeprazole 40mg daily. 3. Schedule a repeat EGD/DIL in 4-6 weeks, will need to dilate to a large dilation number. 4. Await pathology results, will discuss at next appt.          Specimens: were obtained    EBL : < 10 mL    (The following sections must be completed)  Post-Sedation Vital Signs: Vital signs were reviewed and were stable after the procedure (see flow sheet for vitals)            Post-Sedation Exam: Lungs: clear to auscultation bilaterally and Cardiovascular: regular rate and rhythm           Complications: none        Velasquez Wells MD, Nori Lazar

## 2022-10-10 NOTE — PROGRESS NOTES
PAT call completed with patient and his wife. No concerns voiced. Time and place of procedure confirmed. Patient verbalized understanding.

## 2022-10-14 ENCOUNTER — HOSPITAL ENCOUNTER (OUTPATIENT)
Age: 62
Setting detail: OUTPATIENT SURGERY
Discharge: HOME | End: 2022-10-14
Attending: INTERNAL MEDICINE | Admitting: INTERNAL MEDICINE
Payer: COMMERCIAL

## 2022-10-14 ENCOUNTER — ANESTHESIA EVENT (OUTPATIENT)
Dept: ENDOSCOPY | Age: 62
End: 2022-10-14
Payer: COMMERCIAL

## 2022-10-14 ENCOUNTER — ANESTHESIA (OUTPATIENT)
Dept: ENDOSCOPY | Age: 62
End: 2022-10-14
Payer: COMMERCIAL

## 2022-10-14 VITALS
BODY MASS INDEX: 28.52 KG/M2 | WEIGHT: 199.2 LBS | SYSTOLIC BLOOD PRESSURE: 131 MMHG | DIASTOLIC BLOOD PRESSURE: 79 MMHG | HEIGHT: 70 IN | HEART RATE: 72 BPM | TEMPERATURE: 97 F | OXYGEN SATURATION: 99 % | RESPIRATION RATE: 16 BRPM

## 2022-10-14 PROCEDURE — 3609012700 HC EGD DILATION SAVORY: Performed by: INTERNAL MEDICINE

## 2022-10-14 PROCEDURE — 88305 TISSUE EXAM BY PATHOLOGIST: CPT

## 2022-10-14 PROCEDURE — 7100000011 HC PHASE II RECOVERY - ADDTL 15 MIN: Performed by: INTERNAL MEDICINE

## 2022-10-14 PROCEDURE — 7100000010 HC PHASE II RECOVERY - FIRST 15 MIN: Performed by: INTERNAL MEDICINE

## 2022-10-14 PROCEDURE — 3609012400 HC EGD TRANSORAL BIOPSY SINGLE/MULTIPLE: Performed by: INTERNAL MEDICINE

## 2022-10-14 PROCEDURE — 2580000003 HC RX 258: Performed by: NURSE ANESTHETIST, CERTIFIED REGISTERED

## 2022-10-14 PROCEDURE — 88342 IMHCHEM/IMCYTCHM 1ST ANTB: CPT

## 2022-10-14 PROCEDURE — 2500000003 HC RX 250 WO HCPCS: Performed by: NURSE ANESTHETIST, CERTIFIED REGISTERED

## 2022-10-14 PROCEDURE — 2709999900 HC NON-CHARGEABLE SUPPLY: Performed by: INTERNAL MEDICINE

## 2022-10-14 PROCEDURE — 3700000000 HC ANESTHESIA ATTENDED CARE: Performed by: INTERNAL MEDICINE

## 2022-10-14 PROCEDURE — 3700000001 HC ADD 15 MINUTES (ANESTHESIA): Performed by: INTERNAL MEDICINE

## 2022-10-14 PROCEDURE — 6360000002 HC RX W HCPCS: Performed by: NURSE ANESTHETIST, CERTIFIED REGISTERED

## 2022-10-14 RX ORDER — LIDOCAINE HYDROCHLORIDE 20 MG/ML
INJECTION, SOLUTION INFILTRATION; PERINEURAL PRN
Status: DISCONTINUED | OUTPATIENT
Start: 2022-10-14 | End: 2022-10-14 | Stop reason: SDUPTHER

## 2022-10-14 RX ORDER — SODIUM CHLORIDE 9 MG/ML
INJECTION, SOLUTION INTRAVENOUS CONTINUOUS PRN
Status: DISCONTINUED | OUTPATIENT
Start: 2022-10-14 | End: 2022-10-14 | Stop reason: SDUPTHER

## 2022-10-14 RX ORDER — SODIUM CHLORIDE, SODIUM LACTATE, POTASSIUM CHLORIDE, CALCIUM CHLORIDE 600; 310; 30; 20 MG/100ML; MG/100ML; MG/100ML; MG/100ML
INJECTION, SOLUTION INTRAVENOUS CONTINUOUS
Status: DISCONTINUED | OUTPATIENT
Start: 2022-10-14 | End: 2022-10-14 | Stop reason: HOSPADM

## 2022-10-14 RX ORDER — PROPOFOL 10 MG/ML
INJECTION, EMULSION INTRAVENOUS PRN
Status: DISCONTINUED | OUTPATIENT
Start: 2022-10-14 | End: 2022-10-14 | Stop reason: SDUPTHER

## 2022-10-14 RX ADMIN — PROPOFOL 100 MG: 10 INJECTION, EMULSION INTRAVENOUS at 07:46

## 2022-10-14 RX ADMIN — LIDOCAINE HYDROCHLORIDE 100 MG: 20 INJECTION, SOLUTION INFILTRATION; PERINEURAL at 07:46

## 2022-10-14 RX ADMIN — PROPOFOL 50 MG: 10 INJECTION, EMULSION INTRAVENOUS at 07:48

## 2022-10-14 RX ADMIN — SODIUM CHLORIDE: 9 INJECTION, SOLUTION INTRAVENOUS at 07:34

## 2022-10-14 RX ADMIN — LIDOCAINE HYDROCHLORIDE 100 MG: 20 INJECTION, SOLUTION INFILTRATION; PERINEURAL at 07:52

## 2022-10-14 ASSESSMENT — PAIN - FUNCTIONAL ASSESSMENT: PAIN_FUNCTIONAL_ASSESSMENT: NONE - DENIES PAIN

## 2022-10-14 ASSESSMENT — LIFESTYLE VARIABLES: SMOKING_STATUS: 0

## 2022-10-14 NOTE — PROGRESS NOTES
Patient in phase 2. Patient alert and awake. Drank liquids without difficulty. Dr. Tiffanie Ryan reviewed finding with patient and wife and patient had no additional questions on concerns. Vitals stable and patient without pain. Discharge instructions given to patient and wife.

## 2022-10-14 NOTE — DISCHARGE INSTRUCTIONS
IMPRESSION:      1. Successful esophageal dilation with American 48 Jamaican dilator . Did not dilate further for concern of EoE. 2.  Biopsies taken of the stomach  throughout, mid and distal esophagus. .      3. Moderate Gastritis, rings in the esophagus, Schatzki's ring, initially dilated with EGD scope, and inflammation of the distal esophagus gastroesophageal junction. 4.  Needs Propofol for procedures , has a high tolerance. 5.  Otherwise, normal to the 2nd portion of the duodenum . RECOMMENDATIONS:    1. Post dilation precautions. 2. Begin taking Omeprazole 40mg daily. 3. Schedule a repeat EGD/DIL in 4-6 weeks, will need to dilate to a large dilation number. 4. Await pathology results, will discuss at next appt.

## 2022-10-14 NOTE — PROGRESS NOTES
EGD complete, photos taken, 3 specimens taken, pt tolerated procedure well. Dilation complete with 48FR american dilators. Guidewire removed, Spring intact    Scope Number [id number] used.

## 2022-10-14 NOTE — ANESTHESIA PRE PROCEDURE
Department of Anesthesiology  Preprocedure Note       Name:  Johny Montano   Age:  58 y.o.  :  1960                                          MRN:  [id number]         Date:  10/14/2022      Surgeon: Becky Carter):  Jeremías Telles MD    Procedure: Procedure(s):  EGD DILATION    Medications prior to admission:   Prior to Admission medications    Not on File       Current medications:    No current facility-administered medications for this encounter. Allergies:  No Known Allergies    Problem List:    Patient Active Problem List   Diagnosis Code    Family history of colonic polyps Z83.71    At risk for sleep apnea Z91.89    Elevated BP without diagnosis of hypertension R03.0    Seasonal allergies J30.2    Left sided sciatica M54.32    Prediabetes R73.03       Past Medical History:  History reviewed. No pertinent past medical history. Past Surgical History:  History reviewed. No pertinent surgical history. Social History:    Social History     Tobacco Use    Smoking status: Former     Packs/day: 0.50     Years: 40.00     Pack years: 20.00     Types: Cigarettes     Quit date: 2016     Years since quittin.7    Smokeless tobacco: Never   Substance Use Topics    Alcohol use:  Yes     Alcohol/week: 18.0 standard drinks     Types: 18 Cans of beer per week     Comment: a couple per night                                Counseling given: No      Vital Signs (Current):   Vitals:    10/10/22 0924 10/14/22 0645   BP:  (!) 151/77   Pulse:  77   Temp:  36.2 °C (97.1 °F)   TempSrc:  Temporal   SpO2:  98%   Weight: 200 lb (90.7 kg) 199 lb 3.2 oz (90.4 kg)   Height: 5' 10\" (1.778 m) 5' 10\" (1.778 m)                                              BP Readings from Last 3 Encounters:   10/14/22 (!) 151/77   22 131/77   22 138/88       NPO Status: Time of last liquid consumption:                         Time of last solid consumption:                         Date of last liquid consumption: 10/13/22                        Date of last solid food consumption: 10/13/22    BMI:   Wt Readings from Last 3 Encounters:   10/14/22 199 lb 3.2 oz (90.4 kg)   07/25/22 200 lb (90.7 kg)   01/25/22 202 lb 6.4 oz (91.8 kg)     Body mass index is 28.58 kg/m². CBC:   Lab Results   Component Value Date/Time    WBC 6.7 01/25/2022 11:34 PM    RBC 5.04 01/25/2022 11:34 PM    RBC 4.90 11/04/2020 03:15 PM    HGB 15.1 01/25/2022 11:34 PM    HCT 46.4 01/25/2022 11:34 PM    MCV 92.1 01/25/2022 11:34 PM    RDW 13.0 01/25/2022 11:34 PM     01/25/2022 11:34 PM       CMP:   Lab Results   Component Value Date/Time     01/25/2022 11:34 PM    K 4.5 01/25/2022 11:34 PM     01/25/2022 11:34 PM    CO2 21 01/25/2022 11:34 PM    BUN 14 01/25/2022 11:34 PM    CREATININE 0.93 01/25/2022 11:34 PM    CREATININE 0.98 11/04/2020 03:15 PM    GFRAA >60 01/25/2022 11:34 PM    LABGLOM >60 01/25/2022 11:34 PM    GLUCOSE 85 11/04/2020 03:15 PM    PROT 8.1 01/25/2022 11:34 PM    CALCIUM 10.0 01/25/2022 11:34 PM    BILITOT 0.32 01/25/2022 11:34 PM    ALKPHOS 89 01/25/2022 11:34 PM    AST 23 01/25/2022 11:34 PM    ALT 27 01/25/2022 11:34 PM       POC Tests: No results for input(s): POCGLU, POCNA, POCK, POCCL, POCBUN, POCHEMO, POCHCT in the last 72 hours.     Coags: No results found for: PROTIME, INR, APTT    HCG (If Applicable): No results found for: PREGTESTUR, PREGSERUM, HCG, HCGQUANT     ABGs: No results found for: PHART, PO2ART, EAH7UUV, JXI7SFQ, BEART, G4SJIAPM     Type & Screen (If Applicable):  No results found for: LABABO, LABRH    Drug/Infectious Status (If Applicable):  No results found for: HIV, HEPCAB    COVID-19 Screening (If Applicable): No results found for: COVID19        Anesthesia Evaluation  Patient summary reviewed and Nursing notes reviewed no history of anesthetic complications:   Airway: Mallampati: II  TM distance: >3 FB     Mouth opening: > = 3 FB   Dental: normal exam         Pulmonary: breath sounds clear to auscultation      (-) not a current smoker                           Cardiovascular:  Exercise tolerance: no interval change,       (-) dysrhythmias and  angina    ECG reviewed  Rhythm: regular  Rate: normal                    Neuro/Psych:               GI/Hepatic/Renal:   (+) GERD: no interval change,           Endo/Other:              Pt had no PAT visit       Abdominal:             Vascular: Other Findings:           Anesthesia Plan      MAC and TIVA     ASA 2       Induction: intravenous. Anesthetic plan and risks discussed with patient. Plan discussed with attending.     Attending anesthesiologist reviewed and agrees with Preprocedure content                JEREMY Khalil - CRNA   10/14/2022

## 2022-10-14 NOTE — ANESTHESIA POSTPROCEDURE EVALUATION
Department of Anesthesiology  Postprocedure Note    Patient: Kevin Beltran  MRN: [id number]  YOB: 1960  Date of evaluation: 10/14/2022      Procedure Summary     Date: 10/14/22 Room / Location: [address] [place] [address] / [address] [place] [address]    Anesthesia Start: 8850 Anesthesia Stop: 1390    Procedures:       EGD DILATION      EGD BIOPSY (Left: Esophagus) Diagnosis:       Gastroesophageal reflux disease, unspecified whether esophagitis present      Dysphagia, unspecified type      (Gastroesophageal reflux disease, unspecified whether esophagitis present [K21.9])      (Dysphagia, unspecified type [R13.10])    Surgeons: Mia Gibson MD Responsible Provider: Basil Ariza DO    Anesthesia Type: MAC, TIVA ASA Status: 2          Anesthesia Type: No value filed.     Renzo Phase I: Renzo Score: 10    Renzo Phase II: Renzo Score: 10      Anesthesia Post Evaluation    Patient participation: complete - patient participated  Level of consciousness: awake and alert  Pain score: 0  Airway patency: patent  Nausea & Vomiting: no nausea and no vomiting  Complications: no  Cardiovascular status: hemodynamically stable and blood pressure returned to baseline  Respiratory status: acceptable, room air and spontaneous ventilation  Hydration status: stable

## 2022-12-05 NOTE — H&P
[facility]  Sedation/Analgesia History & Physical    Patient: Alexi Carbajal: 1960  Med Rec#: [id number] Acc#: [id number]   Provider Performing Procedure: Miguel Angel Auguste MD  Primary Care Physician: JEREMY Birmingham CNP    PRE-PROCEDURE   Brief History/Pre-Procedure Diagnosis:The patient is a 58 y.o.,  male with significant past medical history of dysphagia, GERD. MEDICAL HISTORY  []CAD/Valve  []Liver Disease  []Lung Disease []Diabetes  []Hypertension []Renal Disease  [x]Additional information:       has a past medical history of Heartburn. SURGICAL HISTORY   has a past surgical history that includes Upper gastrointestinal endoscopy (N/A, 10/14/2022) and Upper gastrointestinal endoscopy (Left, 10/14/2022). Additional information:       ALLERGIES   Allergies as of 10/14/2022    (No Known Allergies)     Additional information:       MEDICATIONS       Current Facility-Administered Medications:     0.9 % sodium chloride infusion, , IntraVENous, Continuous, Mia Gibson MD, Last Rate: 100 mL/hr at 12/09/22 0704, New Bag at 12/09/22 0704  Prior to Admission medications    Medication Sig Start Date End Date Taking? Authorizing Provider   OMEPRAZOLE PO Take 40 mg by mouth   Yes Historical Provider, MD     Additional information:       PHYSICAL:    height is 5' 10\" (1.778 m) and weight is 201 lb 9.6 oz (91.4 kg). His temporal temperature is 98.6 °F (37 °C). His blood pressure is 135/75 and his pulse is 77. His respiration is 16 and oxygen saturation is 97%.    Heart:  [x]Regular rate and rhythm  []Other:    Lungs:  [x]Clear    []Other:    Abdomen: [x]Soft    []Other:    Mental Status: [x]Alert & Oriented  []Other:      VITAL SIGNS   Patient Vitals for the past 24 hrs:   BP Temp Temp src Pulse Resp SpO2 Height Weight   12/09/22 0646 -- 98.6 °F (37 °C) Temporal -- -- -- -- --   12/09/22 0643 135/75 -- -- 77 16 97 % 5' 10\" (1.778 m) 201 lb 9.6 oz (91.4 kg)       PLANNED PROCEDURE   [x]EGD  []Colonoscopy []Flex Sigmoid  []ERCP []EUS   []Cystoscopy  [] CATH [] BRONCH       Consent: I have discussed with the patient and/or the patient representative the indication, alternatives, and the possible risks and/or complications of the planned procedure and the anesthesia methods. The patient and/or patient representative appear to understand and agree to proceed. SEDATION (see Anesthesia note)    ASA Classification: Class 2 - A normal healthy patient with mild systemic disease    Airway Assessment: normal    Monitoring and Safety: The patient will be placed on a cardiac monitor and vital signs, pulse oximetry and level of consciousness will be continuously evaluated throughout the procedure. The patient will be closely monitored until recovery from the medications is complete and the patient has returned to baseline status. Respiratory therapy will be on standby during the procedure. [x]Pre-procedure diagnostic studies complete and results available. Comment:    [x]Previous sedation/anesthesia experiences assessed. Comment:    [x]The patient is an appropriate candidate to undergo the planned procedure sedation and anesthesia. (Refer to nursing sedation/analgesia documentation record)  [x]Formulation and discussion of sedation/procedure plan, risks, and expectations with patient and/or responsible adult completed. [x]Patient examined immediately prior to the procedure.  (Refer to nursing sedation/analgesia documentation record)    Robin Calderon MD   Electronically signed 12/9/2022 at 7:29 AM

## 2022-12-05 NOTE — PROCEDURES
[address] [address] Endoscopy     EGD Report    Patient: Carmen Briceno  : 1960  Acct#: [de-identified]     BRIEF HISTORY AND INDICATIONS:    The patient is a 58 y.o.,  male with significant past medical history of dysphagia and GERD. Was treated for H. Pylori , but not confirmed eradication. PREMEDICATION:   TIVA anesthesia was used, see Anesthesia note for details. INSTRUMENT:  Olympus GIF H-180 gastroscope    The risks and benefits of upper endoscopy with biopsy and dilation were  described to the patient, including but not limited to bleeding, infection, poking a hole someplace requiring surgery to fix it, having reaction to medication, and death. The patient understood these risks and provided informed consent. The patient was placed in the left lateral decubitus position. Conscious sedation was administered . The patient was continuously monitored to ensure adequate sedation and patient safety. A forward-viewing Olympus endoscope was lubricated and inserted through the mouth into the oropharynx. Under direct visualization, the upper esophagus was intubated. The scope was advanced to the esophagus and stomach to second portion of duodenum. Scope was slowly withdrawn with good views of mucosal surfaces. The scope was retroflexed in the fundus. Findings and maneuvers are listed in impression below. The patient tolerated the procedure well. The scope was removed. The patient was removed to the recovery area. IMPRESSION:      1.  Schatzki's Ring  . 2.  Biopsies taken of the stomach body and fundus to confirm eradication of H. Pylori. .      3. Otherwise, normal exam to the 2nd portion of the duodenum. RECOMMENDATIONS:    1. Resume all meds and start post esophageal dilation precautions/diet for today. 2. Follow up pathology by mail, will be about a month. 3. Follow up with Dr. Shad Dozier in office in one month otherwise.        Specimens: were obtained    EBL : < 10 mL    (The following sections must be completed)  Post-Sedation Vital Signs: Vital signs were reviewed and were stable after the procedure (see flow sheet for vitals)            Post-Sedation Exam: Lungs: clear to auscultation bilaterally and Cardiovascular: regular rate and rhythm           Complications: none        Louisa Tobias MD, [facility]

## 2022-12-09 ENCOUNTER — ANESTHESIA (OUTPATIENT)
Dept: ENDOSCOPY | Age: 62
End: 2022-12-09
Payer: COMMERCIAL

## 2022-12-09 ENCOUNTER — ANESTHESIA EVENT (OUTPATIENT)
Dept: ENDOSCOPY | Age: 62
End: 2022-12-09
Payer: COMMERCIAL

## 2022-12-09 ENCOUNTER — HOSPITAL ENCOUNTER (OUTPATIENT)
Age: 62
Setting detail: OUTPATIENT SURGERY
Discharge: HOME | End: 2022-12-09
Attending: INTERNAL MEDICINE | Admitting: INTERNAL MEDICINE
Payer: COMMERCIAL

## 2022-12-09 VITALS
OXYGEN SATURATION: 98 % | TEMPERATURE: 97.5 F | RESPIRATION RATE: 15 BRPM | BODY MASS INDEX: 28.86 KG/M2 | SYSTOLIC BLOOD PRESSURE: 106 MMHG | HEART RATE: 90 BPM | WEIGHT: 201.6 LBS | HEIGHT: 70 IN | DIASTOLIC BLOOD PRESSURE: 68 MMHG

## 2022-12-09 PROCEDURE — 2580000003 HC RX 258: Performed by: INTERNAL MEDICINE

## 2022-12-09 PROCEDURE — 3700000000 HC ANESTHESIA ATTENDED CARE: Performed by: INTERNAL MEDICINE

## 2022-12-09 PROCEDURE — 7100000010 HC PHASE II RECOVERY - FIRST 15 MIN: Performed by: INTERNAL MEDICINE

## 2022-12-09 PROCEDURE — 2709999900 HC NON-CHARGEABLE SUPPLY: Performed by: INTERNAL MEDICINE

## 2022-12-09 PROCEDURE — 3609012400 HC EGD TRANSORAL BIOPSY SINGLE/MULTIPLE: Performed by: INTERNAL MEDICINE

## 2022-12-09 PROCEDURE — 6360000002 HC RX W HCPCS: Performed by: NURSE ANESTHETIST, CERTIFIED REGISTERED

## 2022-12-09 PROCEDURE — 3700000001 HC ADD 15 MINUTES (ANESTHESIA): Performed by: INTERNAL MEDICINE

## 2022-12-09 PROCEDURE — 88305 TISSUE EXAM BY PATHOLOGIST: CPT

## 2022-12-09 PROCEDURE — 7100000011 HC PHASE II RECOVERY - ADDTL 15 MIN: Performed by: INTERNAL MEDICINE

## 2022-12-09 PROCEDURE — 3609012700 HC EGD DILATION SAVORY: Performed by: INTERNAL MEDICINE

## 2022-12-09 PROCEDURE — 2500000003 HC RX 250 WO HCPCS: Performed by: NURSE ANESTHETIST, CERTIFIED REGISTERED

## 2022-12-09 RX ORDER — PROPOFOL 10 MG/ML
INJECTION, EMULSION INTRAVENOUS PRN
Status: DISCONTINUED | OUTPATIENT
Start: 2022-12-09 | End: 2022-12-09 | Stop reason: SDUPTHER

## 2022-12-09 RX ORDER — LIDOCAINE HYDROCHLORIDE 20 MG/ML
INJECTION, SOLUTION EPIDURAL; INFILTRATION; INTRACAUDAL; PERINEURAL PRN
Status: DISCONTINUED | OUTPATIENT
Start: 2022-12-09 | End: 2022-12-09 | Stop reason: SDUPTHER

## 2022-12-09 RX ORDER — SODIUM CHLORIDE 9 MG/ML
INJECTION, SOLUTION INTRAVENOUS CONTINUOUS
Status: DISCONTINUED | OUTPATIENT
Start: 2022-12-09 | End: 2022-12-09 | Stop reason: HOSPADM

## 2022-12-09 RX ADMIN — PROPOFOL 30 MG: 10 INJECTION, EMULSION INTRAVENOUS at 07:33

## 2022-12-09 RX ADMIN — LIDOCAINE HYDROCHLORIDE 50 MG: 20 INJECTION, SOLUTION EPIDURAL; INFILTRATION; INTRACAUDAL; PERINEURAL at 07:32

## 2022-12-09 RX ADMIN — PROPOFOL 50 MG: 10 INJECTION, EMULSION INTRAVENOUS at 07:36

## 2022-12-09 RX ADMIN — SODIUM CHLORIDE: 9 INJECTION, SOLUTION INTRAVENOUS at 07:04

## 2022-12-09 RX ADMIN — PROPOFOL 100 MG: 10 INJECTION, EMULSION INTRAVENOUS at 07:32

## 2022-12-09 RX ADMIN — PROPOFOL 50 MG: 10 INJECTION, EMULSION INTRAVENOUS at 07:34

## 2022-12-09 ASSESSMENT — PAIN - FUNCTIONAL ASSESSMENT: PAIN_FUNCTIONAL_ASSESSMENT: NONE - DENIES PAIN

## 2022-12-09 NOTE — PROGRESS NOTES
Recovery mode, pt denies discomfort. Taking in fluids. Dr. Tiffanie Ryan discussed findings with pt and responsible party. Discharge instructions provided and understanding verbalized.

## 2022-12-09 NOTE — DISCHARGE INSTRUCTIONS
IMPRESSION:      1.  Schatzki's Ring  . 2.  Biopsies taken of the stomach body and fundus to confirm eradication of H. Pylori. .      3. Otherwise, normal exam to the 2nd portion of the duodenum. RECOMMENDATIONS:    1. Resume all meds and start post esophageal dilation precautions/diet for today. 2. Follow up pathology by mail, will be about a month. 3. Follow up with Dr. Lupe Stevenson in office in one month otherwise.

## 2022-12-09 NOTE — ANESTHESIA POSTPROCEDURE EVALUATION
Department of Anesthesiology  Postprocedure Note    Patient: Varsha Kirkpatrick  MRN: [id number]  YOB: 1960  Date of evaluation: 12/9/2022      Procedure Summary     Date: 12/09/22 Room / Location: [address] [place] [address] / [address] [place] [address]    Anesthesia Start: 5564 Anesthesia Stop: 1804    Procedures:       EGD DILATION      EGD BIOPSY (Left: Esophagus) Diagnosis:       Dysphagia, unspecified type      (Dysphagia, unspecified type [R13.10])    Surgeons: Naz Interiano MD Responsible Provider: Farhat Torres DO    Anesthesia Type: MAC ASA Status: 2          Anesthesia Type: No value filed.     Renzo Phase I: Renzo Score: 10    Renzo Phase II:        Anesthesia Post Evaluation    Patient location during evaluation: bedside  Patient participation: complete - patient participated  Level of consciousness: awake and alert  Airway patency: patent  Nausea & Vomiting: no nausea and no vomiting  Complications: no  Cardiovascular status: hemodynamically stable  Respiratory status: room air, spontaneous ventilation and acceptable  Hydration status: euvolemic

## 2022-12-09 NOTE — ANESTHESIA PRE PROCEDURE
Department of Anesthesiology  Preprocedure Note       Name:  Carmen Briceno   Age:  58 y.o.  :  1960                                          MRN:  [id number]         Date:  2022      Surgeon: Rosa Vaughan):  Tez Casarez MD    Procedure: Procedure(s):  EGD DILATION    Medications prior to admission:   Prior to Admission medications    Medication Sig Start Date End Date Taking? Authorizing Provider   OMEPRAZOLE PO Take 40 mg by mouth   Yes Historical Provider, MD       Current medications:    Current Facility-Administered Medications   Medication Dose Route Frequency Provider Last Rate Last Admin    0.9 % sodium chloride infusion   IntraVENous Continuous Tez Casarez  mL/hr at 22 0704 New Bag at 22 0704       Allergies:  No Known Allergies    Problem List:    Patient Active Problem List   Diagnosis Code    Family history of colonic polyps Z83.71    At risk for sleep apnea Z91.89    Elevated BP without diagnosis of hypertension R03.0    Seasonal allergies J30.2    Left sided sciatica M54.32    Prediabetes R73.03       Past Medical History:        Diagnosis Date    Heartburn        Past Surgical History:        Procedure Laterality Date    UPPER GASTROINTESTINAL ENDOSCOPY N/A 10/14/2022    EGD DILATION performed by Tez Casarez MD at [facility] ENDOSCOPY Left 10/14/2022    EGD BIOPSY performed by Tez Casarez MD at [facility]       Social History:    Social History     Tobacco Use    Smoking status: Former     Packs/day: 0.50     Years: 40.00     Pack years: 20.00     Types: Cigarettes     Quit date: 2016     Years since quittin.8    Smokeless tobacco: Never   Substance Use Topics    Alcohol use:  Yes     Alcohol/week: 18.0 standard drinks     Types: 18 Cans of beer per week     Comment: a couple per night                                Counseling given: Not Answered      Vital Signs (Current):   Vitals:    22 7343 BEART, [id number]     Type & Screen (If Applicable):  No results found for: LABABO, LABRH    Drug/Infectious Status (If Applicable):  No results found for: HIV, HEPCAB    COVID-19 Screening (If Applicable): No results found for: COVID19        Anesthesia Evaluation  Patient summary reviewed and Nursing notes reviewed  Airway: Mallampati: II  TM distance: <3 FB   Neck ROM: full  Mouth opening: > = 3 FB   Dental:      Comment: Denies any loose/removable    Pulmonary:Negative Pulmonary ROS and normal exam                               Cardiovascular:Negative CV ROS  Exercise tolerance: good (>4 METS),           Rhythm: regular  Rate: normal                    Neuro/Psych:   Negative Neuro/Psych ROS              GI/Hepatic/Renal:   (+) GERD:,           Endo/Other: Negative Endo/Other ROS             Pt had no PAT visit       Abdominal:             Vascular: negative vascular ROS. Other Findings:           Anesthesia Plan      MAC     ASA 2       Induction: intravenous. Anesthetic plan and risks discussed with patient. Plan discussed with attending.                     JEREMY Hills - CRNA   12/9/2022

## 2022-12-09 NOTE — PROGRESS NOTES
EGD completed. Tolerated well. Photos taken. Biopsies taken. Esophageal dilation with #51 and 54FR american dilator. One specimen jar labeled and sent to lab. Scope [id number].

## 2023-09-18 ENCOUNTER — NURSE ONLY (OUTPATIENT)
Dept: LAB | Age: 63
End: 2023-09-18

## (undated) DEVICE — GLOVE ORTHO 8   MSG9480